# Patient Record
Sex: MALE | Race: WHITE | Employment: UNEMPLOYED | URBAN - METROPOLITAN AREA
[De-identification: names, ages, dates, MRNs, and addresses within clinical notes are randomized per-mention and may not be internally consistent; named-entity substitution may affect disease eponyms.]

---

## 2017-07-15 ENCOUNTER — APPOINTMENT (OUTPATIENT)
Dept: GENERAL RADIOLOGY | Facility: HOSPITAL | Age: 52
DRG: 866 | End: 2017-07-15
Attending: EMERGENCY MEDICINE
Payer: COMMERCIAL

## 2017-07-15 ENCOUNTER — HOSPITAL ENCOUNTER (INPATIENT)
Facility: HOSPITAL | Age: 52
LOS: 3 days | Discharge: LEFT BEFORE DISCHARGE | DRG: 866 | End: 2017-07-18
Attending: EMERGENCY MEDICINE | Admitting: INTERNAL MEDICINE
Payer: COMMERCIAL

## 2017-07-15 DIAGNOSIS — R55 SYNCOPE, NEAR: Primary | ICD-10-CM

## 2017-07-15 DIAGNOSIS — R73.9 HYPERGLYCEMIA: ICD-10-CM

## 2017-07-15 DIAGNOSIS — D69.6 THROMBOCYTOPENIA (HCC): ICD-10-CM

## 2017-07-15 DIAGNOSIS — B02.30 HERPES ZOSTER WITH OPHTHALMIC COMPLICATION, UNSPECIFIED HERPES ZOSTER EYE DISEASE: ICD-10-CM

## 2017-07-15 DIAGNOSIS — E87.1 HYPONATREMIA: ICD-10-CM

## 2017-07-15 LAB
ALBUMIN SERPL-MCNC: 3.5 G/DL (ref 3.5–4.8)
ALP LIVER SERPL-CCNC: 91 U/L (ref 45–117)
ALT SERPL-CCNC: 35 U/L (ref 17–63)
AST SERPL-CCNC: 22 U/L (ref 15–41)
BASOPHILS # BLD AUTO: 0.05 X10(3) UL (ref 0–0.1)
BASOPHILS NFR BLD AUTO: 0.6 %
BILIRUB SERPL-MCNC: 0.9 MG/DL (ref 0.1–2)
BILIRUB UR QL STRIP.AUTO: NEGATIVE
BUN BLD-MCNC: 16 MG/DL (ref 8–20)
CALCIUM BLD-MCNC: 8.8 MG/DL (ref 8.3–10.3)
CHLORIDE: 97 MMOL/L (ref 101–111)
CLARITY UR REFRACT.AUTO: CLEAR
CO2: 25 MMOL/L (ref 22–32)
COLOR UR AUTO: YELLOW
CREAT BLD-MCNC: 1.26 MG/DL (ref 0.7–1.3)
EOSINOPHIL # BLD AUTO: 0.03 X10(3) UL (ref 0–0.3)
EOSINOPHIL NFR BLD AUTO: 0.3 %
ERYTHROCYTE [DISTWIDTH] IN BLOOD BY AUTOMATED COUNT: 13.5 % (ref 11.5–16)
GLUCOSE BLD-MCNC: 143 MG/DL (ref 70–99)
GLUCOSE UR STRIP.AUTO-MCNC: NEGATIVE MG/DL
HCT VFR BLD AUTO: 47.6 % (ref 37–53)
HGB BLD-MCNC: 17 G/DL (ref 13–17)
IMMATURE GRANULOCYTE COUNT: 0.07 X10(3) UL (ref 0–1)
IMMATURE GRANULOCYTE RATIO %: 0.8 %
KETONES UR STRIP.AUTO-MCNC: NEGATIVE MG/DL
LEUKOCYTE ESTERASE UR QL STRIP.AUTO: NEGATIVE
LYMPHOCYTES # BLD AUTO: 1.02 X10(3) UL (ref 0.9–4)
LYMPHOCYTES NFR BLD AUTO: 11.3 %
M PROTEIN MFR SERPL ELPH: 7.1 G/DL (ref 6.1–8.3)
MCH RBC QN AUTO: 33.4 PG (ref 27–33.2)
MCHC RBC AUTO-ENTMCNC: 35.7 G/DL (ref 31–37)
MCV RBC AUTO: 93.5 FL (ref 80–99)
MONOCYTES # BLD AUTO: 0.55 X10(3) UL (ref 0.1–0.6)
MONOCYTES NFR BLD AUTO: 6.1 %
NEUTROPHIL ABS PRELIM: 7.3 X10 (3) UL (ref 1.3–6.7)
NEUTROPHILS # BLD AUTO: 7.3 X10(3) UL (ref 1.3–6.7)
NEUTROPHILS NFR BLD AUTO: 80.9 %
NITRITE UR QL STRIP.AUTO: NEGATIVE
PH UR STRIP.AUTO: 6 [PH] (ref 4.5–8)
PLATELET # BLD AUTO: 109 10(3)UL (ref 150–450)
POTASSIUM SERPL-SCNC: 4 MMOL/L (ref 3.6–5.1)
PROT UR STRIP.AUTO-MCNC: NEGATIVE MG/DL
RBC # BLD AUTO: 5.09 X10(6)UL (ref 4.3–5.7)
RED CELL DISTRIBUTION WIDTH-SD: 46.7 FL (ref 35.1–46.3)
SODIUM SERPL-SCNC: 132 MMOL/L (ref 136–144)
SP GR UR STRIP.AUTO: 1.01 (ref 1–1.03)
TROPONIN: <0.046 NG/ML (ref ?–0.05)
UROBILINOGEN UR STRIP.AUTO-MCNC: <2 MG/DL
WBC # BLD AUTO: 9 X10(3) UL (ref 4–13)

## 2017-07-15 PROCEDURE — 99285 EMERGENCY DEPT VISIT HI MDM: CPT

## 2017-07-15 PROCEDURE — 96361 HYDRATE IV INFUSION ADD-ON: CPT

## 2017-07-15 PROCEDURE — 80053 COMPREHEN METABOLIC PANEL: CPT | Performed by: EMERGENCY MEDICINE

## 2017-07-15 PROCEDURE — 84484 ASSAY OF TROPONIN QUANT: CPT | Performed by: EMERGENCY MEDICINE

## 2017-07-15 PROCEDURE — 71010 XR CHEST AP PORTABLE  (CPT=71010): CPT | Performed by: EMERGENCY MEDICINE

## 2017-07-15 PROCEDURE — 96365 THER/PROPH/DIAG IV INF INIT: CPT

## 2017-07-15 PROCEDURE — 93005 ELECTROCARDIOGRAM TRACING: CPT

## 2017-07-15 PROCEDURE — 87150 DNA/RNA AMPLIFIED PROBE: CPT | Performed by: EMERGENCY MEDICINE

## 2017-07-15 PROCEDURE — 36415 COLL VENOUS BLD VENIPUNCTURE: CPT

## 2017-07-15 PROCEDURE — 87076 CULTURE ANAEROBE IDENT EACH: CPT | Performed by: EMERGENCY MEDICINE

## 2017-07-15 PROCEDURE — 87070 CULTURE OTHR SPECIMN AEROBIC: CPT | Performed by: EMERGENCY MEDICINE

## 2017-07-15 PROCEDURE — 87040 BLOOD CULTURE FOR BACTERIA: CPT | Performed by: EMERGENCY MEDICINE

## 2017-07-15 PROCEDURE — 87205 SMEAR GRAM STAIN: CPT | Performed by: EMERGENCY MEDICINE

## 2017-07-15 PROCEDURE — S0077 INJECTION, CLINDAMYCIN PHOSP: HCPCS | Performed by: EMERGENCY MEDICINE

## 2017-07-15 PROCEDURE — 93010 ELECTROCARDIOGRAM REPORT: CPT | Performed by: INTERNAL MEDICINE

## 2017-07-15 PROCEDURE — 93010 ELECTROCARDIOGRAM REPORT: CPT

## 2017-07-15 PROCEDURE — 81001 URINALYSIS AUTO W/SCOPE: CPT | Performed by: EMERGENCY MEDICINE

## 2017-07-15 PROCEDURE — 85025 COMPLETE CBC W/AUTO DIFF WBC: CPT | Performed by: EMERGENCY MEDICINE

## 2017-07-15 RX ORDER — ACETAMINOPHEN 325 MG/1
650 TABLET ORAL EVERY 6 HOURS PRN
Status: DISCONTINUED | OUTPATIENT
Start: 2017-07-15 | End: 2017-07-18

## 2017-07-15 RX ORDER — ENOXAPARIN SODIUM 100 MG/ML
40 INJECTION SUBCUTANEOUS DAILY
Status: DISCONTINUED | OUTPATIENT
Start: 2017-07-15 | End: 2017-07-18

## 2017-07-15 RX ORDER — NICOTINE 21 MG/24HR
1 PATCH, TRANSDERMAL 24 HOURS TRANSDERMAL DAILY
Status: DISCONTINUED | OUTPATIENT
Start: 2017-07-15 | End: 2017-07-15

## 2017-07-15 RX ORDER — ONDANSETRON 2 MG/ML
4 INJECTION INTRAMUSCULAR; INTRAVENOUS EVERY 4 HOURS PRN
Status: DISCONTINUED | OUTPATIENT
Start: 2017-07-15 | End: 2017-07-15

## 2017-07-15 RX ORDER — PREDNISOLONE ACETATE 10 MG/ML
SUSPENSION/ DROPS OPHTHALMIC 2 TIMES DAILY
COMMUNITY
End: 2017-09-07

## 2017-07-15 RX ORDER — ONDANSETRON 2 MG/ML
4 INJECTION INTRAMUSCULAR; INTRAVENOUS EVERY 6 HOURS PRN
Status: DISCONTINUED | OUTPATIENT
Start: 2017-07-15 | End: 2017-07-18

## 2017-07-15 RX ORDER — ACETAMINOPHEN AND CODEINE PHOSPHATE 300; 30 MG/1; MG/1
1 TABLET ORAL EVERY 4 HOURS PRN
Status: DISCONTINUED | OUTPATIENT
Start: 2017-07-15 | End: 2017-07-16

## 2017-07-15 RX ORDER — GABAPENTIN 300 MG/1
300 CAPSULE ORAL 3 TIMES DAILY
Status: DISCONTINUED | OUTPATIENT
Start: 2017-07-15 | End: 2017-07-18

## 2017-07-15 RX ORDER — SODIUM CHLORIDE 9 MG/ML
INJECTION, SOLUTION INTRAVENOUS CONTINUOUS
Status: DISCONTINUED | OUTPATIENT
Start: 2017-07-15 | End: 2017-07-18

## 2017-07-15 RX ORDER — NICOTINE 21 MG/24HR
1 PATCH, TRANSDERMAL 24 HOURS TRANSDERMAL DAILY
Status: DISCONTINUED | OUTPATIENT
Start: 2017-07-15 | End: 2017-07-18

## 2017-07-15 RX ORDER — SODIUM CHLORIDE 9 MG/ML
INJECTION, SOLUTION INTRAVENOUS CONTINUOUS
Status: DISCONTINUED | OUTPATIENT
Start: 2017-07-15 | End: 2017-07-15

## 2017-07-15 RX ORDER — CLINDAMYCIN PHOSPHATE 600 MG/50ML
600 INJECTION INTRAVENOUS ONCE
Status: COMPLETED | OUTPATIENT
Start: 2017-07-15 | End: 2017-07-15

## 2017-07-15 NOTE — PROGRESS NOTES
Received this patient from ER. Patient admitted due to left facial/head shingels and syncopal episode at home. Patient is A&O x3. Tele: sinus rhythm. VSS. Afebrile. Left eye: severe swollen, unable to open left eye,   Laft face: swollen.   Left head: Red

## 2017-07-15 NOTE — ED PROVIDER NOTES
Patient Seen in: [facility] Emergency Department    History   Patient presents with:  Syncope (cardiovascular, neurologic)  Rash Skin Problem (integumentary)    Stated Complaint: Shingles, Syncope    HPI    Patient started having a rash on the left LLC  2005: HERNIA SURGERY      Comment: LT INGUINAL  8/19/08  [facility]): OTHER SURGICAL HISTORY      Comment: EGD (GERD): esophagitis, gastritis, hiatal                hernia; ? eosinophilic esophagitis, distal                esoph bx with inc above.    PSFH elements reviewed from today and agreed except as otherwise stated in HPI.     Physical Exam   ED Triage Vitals [07/15/17 1044]  BP: 110/69  Pulse: 80  Resp: 16  Temp: 98.5 °F (36.9 °C)  Temp src: n/a  SpO2: 98 %  O2 Device: n/a    Current:BP .0 (*)     MCH 33.4 (*)     RDW-SD 46.7 (*)     Neutrophil Absolute Prelim 7.30 (*)     Neutrophil Absolute 7.30 (*)     All other components within normal limits   TROPONIN I - Normal   CBC WITH DIFFERENTIAL WITH PLATELET    Narrative:      The foll . She will admit   I discussed the case with the [facility] ophthalmologist, Dr. Staci Juarez.   He will consult with the hospitalist regarding any further treatment recommendations from his service    Disposition and Plan     Clinical Impression:  Syncope,

## 2017-07-15 NOTE — CONSULTS
INFECTIOUS DISEASE CONSULTATION    Karen Mckee Patient Status:  Inpatient    1965 MRN [id number]   [facility] 3NE-A Attending Vu Pascual, [address] [facility] Day # 0 PCP Norvel Lefort, Performed by Kodak Campos at [address]  05/01/2012: SKIN SURGERY      Comment: BCC nodular / R medial canthus / Mohs surgery                by Dr. Yuriy Alfonso  Family History   Problem Relation Age of Onset   • Stroke Fath erythema, no open wounds      Laboratory Data:      Recent Labs   Lab  07/15/17   1059   RBC  5.09   HGB  17.0   HCT  47.6   MCV  93.5   MCH  33.4*   MCHC  35.7   RDW  13.5   NEPRELIM  7.30*   WBC  9.0   PLT  109.0*       Recent Labs   Lab  07/15/17   1059

## 2017-07-16 LAB
BASOPHILS # BLD AUTO: 0.03 X10(3) UL (ref 0–0.1)
BASOPHILS NFR BLD AUTO: 0.5 %
BUN BLD-MCNC: 11 MG/DL (ref 8–20)
CALCIUM BLD-MCNC: 8.1 MG/DL (ref 8.3–10.3)
CHLORIDE: 105 MMOL/L (ref 101–111)
CO2: 25 MMOL/L (ref 22–32)
CREAT BLD-MCNC: 0.75 MG/DL (ref 0.7–1.3)
EOSINOPHIL # BLD AUTO: 0.03 X10(3) UL (ref 0–0.3)
EOSINOPHIL NFR BLD AUTO: 0.5 %
ERYTHROCYTE [DISTWIDTH] IN BLOOD BY AUTOMATED COUNT: 13.4 % (ref 11.5–16)
GLUCOSE BLD-MCNC: 100 MG/DL (ref 70–99)
HAV IGM SER QL: 2.1 MG/DL (ref 1.7–3)
HCT VFR BLD AUTO: 41.6 % (ref 37–53)
HGB BLD-MCNC: 14.7 G/DL (ref 13–17)
IMMATURE GRANULOCYTE COUNT: 0.07 X10(3) UL (ref 0–1)
IMMATURE GRANULOCYTE RATIO %: 1.1 %
LYMPHOCYTES # BLD AUTO: 1.79 X10(3) UL (ref 0.9–4)
LYMPHOCYTES NFR BLD AUTO: 28.8 %
MCH RBC QN AUTO: 33.5 PG (ref 27–33.2)
MCHC RBC AUTO-ENTMCNC: 35.3 G/DL (ref 31–37)
MCV RBC AUTO: 94.8 FL (ref 80–99)
MONOCYTES # BLD AUTO: 0.66 X10(3) UL (ref 0.1–0.6)
MONOCYTES NFR BLD AUTO: 10.6 %
NEUTROPHIL ABS PRELIM: 3.64 X10 (3) UL (ref 1.3–6.7)
NEUTROPHILS # BLD AUTO: 3.64 X10(3) UL (ref 1.3–6.7)
NEUTROPHILS NFR BLD AUTO: 58.5 %
PLATELET # BLD AUTO: 95 10(3)UL (ref 150–450)
POTASSIUM SERPL-SCNC: 3.7 MMOL/L (ref 3.6–5.1)
RBC # BLD AUTO: 4.39 X10(6)UL (ref 4.3–5.7)
RED CELL DISTRIBUTION WIDTH-SD: 47.1 FL (ref 35.1–46.3)
SODIUM SERPL-SCNC: 138 MMOL/L (ref 136–144)
TSI SER-ACNC: 0.88 MIU/ML (ref 0.35–5.5)
WBC # BLD AUTO: 6.2 X10(3) UL (ref 4–13)

## 2017-07-16 PROCEDURE — 80048 BASIC METABOLIC PNL TOTAL CA: CPT | Performed by: INTERNAL MEDICINE

## 2017-07-16 PROCEDURE — 93010 ELECTROCARDIOGRAM REPORT: CPT | Performed by: INTERNAL MEDICINE

## 2017-07-16 PROCEDURE — 84443 ASSAY THYROID STIM HORMONE: CPT | Performed by: INTERNAL MEDICINE

## 2017-07-16 PROCEDURE — 83735 ASSAY OF MAGNESIUM: CPT | Performed by: INTERNAL MEDICINE

## 2017-07-16 PROCEDURE — 85025 COMPLETE CBC W/AUTO DIFF WBC: CPT | Performed by: INTERNAL MEDICINE

## 2017-07-16 PROCEDURE — 93005 ELECTROCARDIOGRAM TRACING: CPT

## 2017-07-16 RX ORDER — TRAMADOL HYDROCHLORIDE 50 MG/1
TABLET ORAL EVERY 6 HOURS PRN
Status: DISCONTINUED | OUTPATIENT
Start: 2017-07-16 | End: 2017-07-18

## 2017-07-16 RX ORDER — PREDNISOLONE ACETATE 10 MG/ML
1 SUSPENSION/ DROPS OPHTHALMIC 2 TIMES DAILY
Status: DISCONTINUED | OUTPATIENT
Start: 2017-07-16 | End: 2017-07-18

## 2017-07-16 RX ORDER — TRAMADOL HYDROCHLORIDE 50 MG/1
50 TABLET ORAL EVERY 6 HOURS PRN
Status: DISCONTINUED | OUTPATIENT
Start: 2017-07-16 | End: 2017-07-16

## 2017-07-16 RX ORDER — POTASSIUM CHLORIDE 20 MEQ/1
40 TABLET, EXTENDED RELEASE ORAL ONCE
Status: COMPLETED | OUTPATIENT
Start: 2017-07-16 | End: 2017-07-16

## 2017-07-16 RX ORDER — ASPIRIN 81 MG/1
81 TABLET, CHEWABLE ORAL DAILY
Status: DISCONTINUED | OUTPATIENT
Start: 2017-07-16 | End: 2017-07-17

## 2017-07-16 NOTE — PLAN OF CARE
Assumed pt care at 1545. Aa/ox4. Breathing unlabored. Denies pain. 1622-tachycardia on monitor up to 140-150s, appears rapid afib on bedside telemetry monitor (some artifact present). Patient feels palpitations. Denies lightheadedness/dizziness.  Episod

## 2017-07-16 NOTE — PLAN OF CARE
NEUROLOGICAL - ADULT    • Achieves stable or improved neurological status Progressing        PAIN - ADULT    • Verbalizes/displays adequate comfort level or patient's stated pain goal Progressing        Patient/Family Goals    • Patient/Family Long Term Go

## 2017-07-16 NOTE — H&P
General Medicine H&P     Patient presents with:  Syncope (cardiovascular, neurologic)  Rash Skin Problem (integumentary)       PCP: Lory Mejia MD    History of Present Illness: Patient is a 45year old male with PMH sig for GERD, HL, who p/t [address] [facility] ED c [facility] reflux  8/32/6150: REPAIR UMBILICAL TLYP,0+B/H,VPHKT      Comment: Performed by Stephany Wilder at [address]  05/01/2012: SKIN SURGERY      Comment: BCC nodular / R medial canthus / Mohs surgery                by Dr. Monica Samuel CA 8.1 07/16/2017       Radiology: Xr Chest Ap Portable  (cpt=71010)    Result Date: 7/15/2017  PROCEDURE:  XR CHEST AP PORTABLE (CPT=71010)  TECHNIQUE:  AP chest radiograph was obtained. COMPARISON:  None.   INDICATIONS:  Shingles, Syncope  PATIENT STAT

## 2017-07-16 NOTE — PROGRESS NOTES
[facility]                INFECTIOUS DISEASE PROGRESS NOTE    Audrey Johns Patient Status:  Inpatient    1965 MRN [id number]   [facility] 3NE-A Attending Annia Mtz, [address] [facility] Day # 1 PCP Tyron William MD     [facility] ([facility] [address]     Hyponatremia     Hyperglycemia      ASSESSMENT/PLAN:  1.  Zoster left orbit/head  Left eyelid swelling improved  Continue IV acyclovir, and cefazolin for poss bact infection   Await optho eval    2 Skin/heat rash, less likely herpetic vesicles  No

## 2017-07-17 ENCOUNTER — APPOINTMENT (OUTPATIENT)
Dept: CV DIAGNOSTICS | Facility: HOSPITAL | Age: 52
DRG: 866 | End: 2017-07-17
Attending: INTERNAL MEDICINE
Payer: COMMERCIAL

## 2017-07-17 LAB
ATRIAL RATE: 122 BPM
ATRIAL RATE: 163 BPM
BASOPHILS # BLD AUTO: 0.05 X10(3) UL (ref 0–0.1)
BASOPHILS NFR BLD AUTO: 0.8 %
BUN BLD-MCNC: 9 MG/DL (ref 8–20)
CALCIUM BLD-MCNC: 8.6 MG/DL (ref 8.3–10.3)
CHLORIDE: 106 MMOL/L (ref 101–111)
CO2: 27 MMOL/L (ref 22–32)
CREAT BLD-MCNC: 0.74 MG/DL (ref 0.7–1.3)
EOSINOPHIL # BLD AUTO: 0.09 X10(3) UL (ref 0–0.3)
EOSINOPHIL NFR BLD AUTO: 1.4 %
ERYTHROCYTE [DISTWIDTH] IN BLOOD BY AUTOMATED COUNT: 13.6 % (ref 11.5–16)
GLUCOSE BLD-MCNC: 81 MG/DL (ref 70–99)
HAV IGM SER QL: 2.3 MG/DL (ref 1.7–3)
HCT VFR BLD AUTO: 42.9 % (ref 37–53)
HGB BLD-MCNC: 14.9 G/DL (ref 13–17)
IMMATURE GRANULOCYTE COUNT: 0.07 X10(3) UL (ref 0–1)
IMMATURE GRANULOCYTE RATIO %: 1.1 %
LYMPHOCYTES # BLD AUTO: 2.7 X10(3) UL (ref 0.9–4)
LYMPHOCYTES NFR BLD AUTO: 41.2 %
MCH RBC QN AUTO: 33.1 PG (ref 27–33.2)
MCHC RBC AUTO-ENTMCNC: 34.7 G/DL (ref 31–37)
MCV RBC AUTO: 95.3 FL (ref 80–99)
MONOCYTES # BLD AUTO: 0.62 X10(3) UL (ref 0.1–0.6)
MONOCYTES NFR BLD AUTO: 9.5 %
NEUTROPHIL ABS PRELIM: 3.02 X10 (3) UL (ref 1.3–6.7)
NEUTROPHILS # BLD AUTO: 3.02 X10(3) UL (ref 1.3–6.7)
NEUTROPHILS NFR BLD AUTO: 46 %
P AXIS: 80 DEGREES
P-R INTERVAL: 130 MS
PLATELET # BLD AUTO: 108 10(3)UL (ref 150–450)
POTASSIUM SERPL-SCNC: 4.1 MMOL/L (ref 3.6–5.1)
POTASSIUM SERPL-SCNC: 4.1 MMOL/L (ref 3.6–5.1)
Q-T INTERVAL: 300 MS
Q-T INTERVAL: 312 MS
QRS DURATION: 72 MS
QRS DURATION: 94 MS
QTC CALCULATION (BEZET): 444 MS
QTC CALCULATION (BEZET): 492 MS
R AXIS: -24 DEGREES
R AXIS: 20 DEGREES
RBC # BLD AUTO: 4.5 X10(6)UL (ref 4.3–5.7)
RED CELL DISTRIBUTION WIDTH-SD: 48 FL (ref 35.1–46.3)
SODIUM SERPL-SCNC: 138 MMOL/L (ref 136–144)
T AXIS: 46 DEGREES
T AXIS: 49 DEGREES
VENTRICULAR RATE: 122 BPM
VENTRICULAR RATE: 162 BPM
WBC # BLD AUTO: 6.6 X10(3) UL (ref 4–13)

## 2017-07-17 PROCEDURE — 84132 ASSAY OF SERUM POTASSIUM: CPT | Performed by: INTERNAL MEDICINE

## 2017-07-17 PROCEDURE — 93306 TTE W/DOPPLER COMPLETE: CPT | Performed by: INTERNAL MEDICINE

## 2017-07-17 PROCEDURE — 85025 COMPLETE CBC W/AUTO DIFF WBC: CPT | Performed by: INTERNAL MEDICINE

## 2017-07-17 PROCEDURE — 80048 BASIC METABOLIC PNL TOTAL CA: CPT | Performed by: INTERNAL MEDICINE

## 2017-07-17 PROCEDURE — 83735 ASSAY OF MAGNESIUM: CPT | Performed by: INTERNAL MEDICINE

## 2017-07-17 RX ORDER — ROSUVASTATIN CALCIUM 20 MG/1
20 TABLET, COATED ORAL NIGHTLY
Status: DISCONTINUED | OUTPATIENT
Start: 2017-07-17 | End: 2017-07-18

## 2017-07-17 RX ORDER — ASPIRIN 325 MG
325 TABLET, DELAYED RELEASE (ENTERIC COATED) ORAL DAILY
Status: DISCONTINUED | OUTPATIENT
Start: 2017-07-17 | End: 2017-07-18

## 2017-07-17 NOTE — PROGRESS NOTES
[facility]                INFECTIOUS DISEASE PROGRESS NOTE    Allenelma Paz Patient Status:  Inpatient    1965 MRN [id number]   [facility] 3NE-A Attending Carson Teran, [address] [facility] Day # 2 PCP MD Anais Martinez forehead/ scalp with suspected superimposed cellulitis  Left eyelid swelling improved per notes but still with many active lesions and intense erythema  Continue IV acyclovir and cefazolin for poss bact infection for another 24 hr  Appreciate yaron WAN

## 2017-07-17 NOTE — PAYOR COMM NOTE
--------------  ADMISSION REVIEW     Payor: Leonardo Arevalo [facility] #:  [id number]  Authorization Number: [id number]    Admit date: 7/15/2017 10:34 AM       Admitting Physician: Viv Jiang DO  Attending Physician:  Meghan Doss his eye swollen shut. The right lid is unremarkable. The right pupil is round and reacts. Extraocular movements are intact. Skin: Maculopapular rash over the upper torso and proximal upper extremities.        ED Course[SS.1]     EKG    Rate, intervals a discharge from eye  Skin: erythematous macules noted on back    LABS:     Lab Results  Component Value Date   WBC 6.2 07/16/2017   HGB 14.7 07/16/2017   HCT 41.6 07/16/2017   PLT 95.0 07/16/2017   CREATSERUM 0.75 07/16/2017   BUN 11 07/16/2017    07/ Bag 2 g Intravenous Morales Joaquin, KEY    7/17/2017 0021 New Bag 2 g Intravenous Marin Epley, KEY    7/16/2017 1612 New Bag 2 g Intravenous Beltran Esparza RN      Enoxaparin Sodium (LOVENOX) 40 MG/0.4ML injection 40 mg Daily    Date Action Dose Rou exam     A/p : Shingles with mild conjunctival inflammation: PT can continue topical gtts as MRX by his Eye MD     Cardiology Consult  Reason for Consultation: a flutter    Patient is 45year old male who is in the hospital with shingles and possible secon

## 2017-07-17 NOTE — PROGRESS NOTES
[facility] Hospitalist Progress Note     PCP: Rocky Grimes MD    Chief Complaint: follow-up    Overnight/Interim Events:  Pt noted to have periods of tachycardia yesterday. SUBJECTIVE:  Pt feeling a little better today, able to open eye more.  Would like to metoprolol Tartrate  25 mg Oral 2x Daily(Beta Blocker)   • enoxaparin  40 mg Subcutaneous Daily   • acyclovir  800 mg Intravenous Q8H   • ceFAZolin  2 g Intravenous Q8H   • gabapentin  300 mg Oral TID   • nicotine  1 patch Transdermal Daily     • sodium ch

## 2017-07-17 NOTE — CONSULTS
Pt was followed and recently seen at [facility] for Herpes Zoster  No corneal findings identified. Pt was placed on topical steroids and antivial for prophylxis. Pt resports vesicles worse and eyelid swollen. No change in vision, no phtotophobia, no floaters. Yung Gonzalez

## 2017-07-17 NOTE — CONSULTS
Cardiology Consult Note      SUBJECTIVE:    Reason for Consultation: a flutter    History of Present Illness: Patient is 45year old male who is in the hospital with shingles and possible secondary infection who was noted on monitor to have a flutter.   Sta  mg Oral Q6H PRN   Ganciclovir 0.15 % GEL 1 drop 1 drop Left Eye 5 x daily   prednisoLONE acetate (PRED FORTE) 1 % ophthalmic suspension 1 drop 1 drop Left Eye BID   metoprolol Tartrate (LOPRESSOR) tab 25 mg 25 mg Oral 2x Daily(Beta Blocker)   0.9% esophagitis, gastritis, hiatal                hernia; ? eosinophilic esophagitis, distal                esoph bx with increased eosinophilis called c/w               reflux  7/17/9648: REPAIR UMBILICAL EFZW,8+I/C,GRIPB      Comment: Performed by [place] midline and thyroid not enlarged, symmetric, no tenderness/mass/nodules  Lungs: clear to auscultation bilaterally  Chest wall: no tenderness  Heart: S1, S2 normal, no murmur, click, rub or gallop, regular rate and rhythm  Abdomen: soft, non-tender; bowel s asa 162 or 325   Check echo              tsh which is nl  2.   htn switch from ace to BB  3. Dyslipidemia with 10 yr risk over 16 will add crestor 20 mg  4. Shingles   5.     CIARAN untreated would recommend evaluation as out pt and treating

## 2017-07-18 VITALS
TEMPERATURE: 98 F | HEIGHT: 74 IN | WEIGHT: 200 LBS | SYSTOLIC BLOOD PRESSURE: 130 MMHG | RESPIRATION RATE: 18 BRPM | OXYGEN SATURATION: 100 % | DIASTOLIC BLOOD PRESSURE: 84 MMHG | HEART RATE: 62 BPM | BODY MASS INDEX: 25.67 KG/M2

## 2017-07-18 RX ORDER — CEFADROXIL 500 MG/1
500 CAPSULE ORAL 2 TIMES DAILY
Qty: 14 CAPSULE | Refills: 0 | Status: SHIPPED | OUTPATIENT
Start: 2017-07-18 | End: 2017-07-25

## 2017-07-18 RX ORDER — VALACYCLOVIR HYDROCHLORIDE 1 G/1
1 TABLET, FILM COATED ORAL EVERY 8 HOURS SCHEDULED
Qty: 21 TABLET | Refills: 0 | Status: SHIPPED | OUTPATIENT
Start: 2017-07-18 | End: 2017-07-25

## 2017-07-18 NOTE — PLAN OF CARE
Patient very upset when I picked him up as a nurse this am.  He states if I dont have the doctors here by 1100 he is leaving [address]. I spoke with and informed dr. Reji Gay, dr. Calvin Merritt from cardiology. All stated he needs to stay and be seen.   Patien

## 2017-07-18 NOTE — DISCHARGE SUMMARY
[facility] Internal Medicine Discharge Summary   Patient ID:  Mundo Kirk  [id number]  12 year old  9/6/1965    Admit date: 7/15/2017    Discharge date and time: 7/18/2017     Attending Physician: Tonya Valentine    Primary Care Physician: Cortes Lee MD +blurry vision, +water dc from eyes.  +L facial swelling, difficulty opening eye.      Hospital Course:   Herpes zoster forehead/scalp, eyelid involvmenet  -IV acyclovir, started IV ancef for suspected secondary bacterial infection/cellulitis  -d/w Dr. Cardona tablet (10 mg total) by mouth daily.      methylPREDNISolone 4 MG Tbpk  Commonly known as:  MEDROL DOSEPACK  Use as directed for 6 days     prednisoLONE acetate 1 % Susp  Commonly known as:  PRED FORTE     ZIRGAN 0.15 % Gel  Generic drug:  Ganciclovir

## 2017-11-22 PROBLEM — R73.9 HYPERGLYCEMIA: Status: RESOLVED | Noted: 2017-07-15 | Resolved: 2017-11-22

## 2017-11-22 PROBLEM — E87.1 HYPONATREMIA: Status: RESOLVED | Noted: 2017-07-15 | Resolved: 2017-11-22

## 2020-09-17 PROBLEM — R55 SYNCOPE, NEAR: Status: RESOLVED | Noted: 2017-07-15 | Resolved: 2020-09-17

## 2021-04-05 ENCOUNTER — APPOINTMENT (OUTPATIENT)
Dept: CV DIAGNOSTICS | Facility: HOSPITAL | Age: 56
End: 2021-04-05
Attending: INTERNAL MEDICINE
Payer: COMMERCIAL

## 2021-04-05 ENCOUNTER — APPOINTMENT (OUTPATIENT)
Dept: CT IMAGING | Facility: HOSPITAL | Age: 56
End: 2021-04-05
Attending: INTERNAL MEDICINE
Payer: COMMERCIAL

## 2021-04-05 ENCOUNTER — HOSPITAL ENCOUNTER (EMERGENCY)
Facility: HOSPITAL | Age: 56
Discharge: LEFT BEFORE DISCHARGE | End: 2021-04-05
Attending: EMERGENCY MEDICINE
Payer: COMMERCIAL

## 2021-04-05 ENCOUNTER — APPOINTMENT (OUTPATIENT)
Dept: CV DIAGNOSTICS | Facility: HOSPITAL | Age: 56
End: 2021-04-05
Attending: EMERGENCY MEDICINE
Payer: COMMERCIAL

## 2021-04-05 ENCOUNTER — APPOINTMENT (OUTPATIENT)
Dept: GENERAL RADIOLOGY | Facility: HOSPITAL | Age: 56
End: 2021-04-05
Attending: EMERGENCY MEDICINE
Payer: COMMERCIAL

## 2021-04-05 VITALS
TEMPERATURE: 98 F | OXYGEN SATURATION: 96 % | WEIGHT: 200 LBS | BODY MASS INDEX: 25.67 KG/M2 | SYSTOLIC BLOOD PRESSURE: 127 MMHG | RESPIRATION RATE: 18 BRPM | DIASTOLIC BLOOD PRESSURE: 88 MMHG | HEART RATE: 66 BPM | HEIGHT: 74 IN

## 2021-04-05 DIAGNOSIS — I47.1 SVT (SUPRAVENTRICULAR TACHYCARDIA) (HCC): Primary | ICD-10-CM

## 2021-04-05 PROBLEM — R73.9 HYPERGLYCEMIA: Status: ACTIVE | Noted: 2021-04-05

## 2021-04-05 PROCEDURE — 85025 COMPLETE CBC W/AUTO DIFF WBC: CPT | Performed by: EMERGENCY MEDICINE

## 2021-04-05 PROCEDURE — 96361 HYDRATE IV INFUSION ADD-ON: CPT

## 2021-04-05 PROCEDURE — 93306 TTE W/DOPPLER COMPLETE: CPT | Performed by: INTERNAL MEDICINE

## 2021-04-05 PROCEDURE — 84443 ASSAY THYROID STIM HORMONE: CPT | Performed by: EMERGENCY MEDICINE

## 2021-04-05 PROCEDURE — 96375 TX/PRO/DX INJ NEW DRUG ADDON: CPT

## 2021-04-05 PROCEDURE — 83735 ASSAY OF MAGNESIUM: CPT | Performed by: EMERGENCY MEDICINE

## 2021-04-05 PROCEDURE — 93010 ELECTROCARDIOGRAM REPORT: CPT

## 2021-04-05 PROCEDURE — 84484 ASSAY OF TROPONIN QUANT: CPT | Performed by: EMERGENCY MEDICINE

## 2021-04-05 PROCEDURE — 71045 X-RAY EXAM CHEST 1 VIEW: CPT | Performed by: EMERGENCY MEDICINE

## 2021-04-05 PROCEDURE — 93005 ELECTROCARDIOGRAM TRACING: CPT

## 2021-04-05 PROCEDURE — 83880 ASSAY OF NATRIURETIC PEPTIDE: CPT | Performed by: EMERGENCY MEDICINE

## 2021-04-05 PROCEDURE — 80053 COMPREHEN METABOLIC PANEL: CPT | Performed by: EMERGENCY MEDICINE

## 2021-04-05 PROCEDURE — 96374 THER/PROPH/DIAG INJ IV PUSH: CPT

## 2021-04-05 PROCEDURE — 99285 EMERGENCY DEPT VISIT HI MDM: CPT

## 2021-04-05 PROCEDURE — 96376 TX/PRO/DX INJ SAME DRUG ADON: CPT

## 2021-04-05 RX ORDER — ASPIRIN 81 MG/1
324 TABLET, CHEWABLE ORAL ONCE
Status: COMPLETED | OUTPATIENT
Start: 2021-04-05 | End: 2021-04-05

## 2021-04-05 RX ORDER — METOPROLOL TARTRATE 50 MG/1
50 TABLET, FILM COATED ORAL ONCE
Status: COMPLETED | OUTPATIENT
Start: 2021-04-05 | End: 2021-04-05

## 2021-04-05 RX ORDER — ASPIRIN 325 MG
325 TABLET ORAL DAILY
COMMUNITY

## 2021-04-05 RX ORDER — ADENOSINE 3 MG/ML
6 INJECTION, SOLUTION INTRAVENOUS ONCE
Status: COMPLETED | OUTPATIENT
Start: 2021-04-05 | End: 2021-04-05

## 2021-04-05 RX ORDER — DILTIAZEM HYDROCHLORIDE 5 MG/ML
INJECTION INTRAVENOUS
Status: DISCONTINUED
Start: 2021-04-05 | End: 2021-04-05 | Stop reason: WASHOUT

## 2021-04-05 RX ORDER — ROSUVASTATIN CALCIUM 10 MG/1
10 TABLET, COATED ORAL NIGHTLY
COMMUNITY
End: 2021-06-09

## 2021-04-05 RX ORDER — NAPROXEN SODIUM 220 MG
1-2 TABLET ORAL DAILY PRN
COMMUNITY
End: 2021-06-09 | Stop reason: ALTCHOICE

## 2021-04-05 RX ORDER — ADENOSINE 3 MG/ML
INJECTION, SOLUTION INTRAVENOUS
Status: COMPLETED
Start: 2021-04-05 | End: 2021-04-05

## 2021-04-05 RX ORDER — METOPROLOL SUCCINATE 50 MG/1
50 TABLET, EXTENDED RELEASE ORAL DAILY
Qty: 30 TABLET | Refills: 0 | Status: SHIPPED | OUTPATIENT
Start: 2021-04-05 | End: 2021-04-09

## 2021-04-05 RX ORDER — NITROGLYCERIN 0.4 MG/1
TABLET SUBLINGUAL
Status: DISCONTINUED
Start: 2021-04-05 | End: 2021-04-05 | Stop reason: WASHOUT

## 2021-04-05 RX ORDER — DILTIAZEM HYDROCHLORIDE 5 MG/ML
5 INJECTION INTRAVENOUS EVERY 5 MIN PRN
Status: DISCONTINUED | OUTPATIENT
Start: 2021-04-05 | End: 2021-04-05

## 2021-04-05 RX ORDER — NITROGLYCERIN 0.4 MG/1
0.4 TABLET SUBLINGUAL ONCE
Status: COMPLETED | OUTPATIENT
Start: 2021-04-05 | End: 2021-04-05

## 2021-04-05 RX ORDER — METOPROLOL TARTRATE 5 MG/5ML
5 INJECTION INTRAVENOUS ONCE
Status: COMPLETED | OUTPATIENT
Start: 2021-04-05 | End: 2021-04-05

## 2021-04-05 RX ORDER — METOPROLOL TARTRATE 5 MG/5ML
5 INJECTION INTRAVENOUS EVERY 5 MIN PRN
Status: DISCONTINUED | OUTPATIENT
Start: 2021-04-05 | End: 2021-04-05

## 2021-04-05 RX ORDER — METOPROLOL TARTRATE 5 MG/5ML
INJECTION INTRAVENOUS
Status: COMPLETED
Start: 2021-04-05 | End: 2021-04-05

## 2021-04-05 NOTE — CONSULTS
[facility] Cardiology Consultation    Calvin Foot Patient Status:  Emergency    1965 MRN [id number]   Location [address] [facility] Attending Eddie Chapa MD   Hosp Day # 0 PCP Raphael Hebert MD     Reason for Consultation:  SVT, el 8/19/08  CFS Cece Laboy)    EGD (GERD): esophagitis, gastritis, hiatal hernia; ? eosinophilic esophagitis, distal esoph bx with increased eosinophilis called c/w reflux   • SKIN SURGERY  05/01/2012    BCC nodular / R medial canthus / Mohs surgery by Dr. Julianna Partida Chem:  Recent Labs   Lab 04/05/21 0822      K 3.3*      CO2 27.0   BUN 14   CREATSERUM 1.17   CA 8.7   MG 2.2   *       Recent Labs   Lab 04/05/21 0822   ALT 31   AST 16   ALB 4.2       No results for input(s): PTP, INR in the la

## 2021-04-05 NOTE — ED QUICK NOTES
Pt reevaluated by dr. Flor Moreno, gave pt the option to be admitted in the hospital. Pt declined. Pt informed of risks involved in leaving against medical advise including deterioration in condtion which can result to death, pt verbalizing understanding.  Pt

## 2021-04-05 NOTE — ED QUICK NOTES
Pt back in room, per Luz Maria Hernandez pt's testing was not done, did not tolerate it and was claustrophobic. Per Luz Maria Shaw and Dr. Diane bush.

## 2021-04-05 NOTE — IMAGING NOTE
Patient received in CT room 4. Placed on monitor. Scan explained. Pre medication expl;ained Hr 48-81 BPM. Patient reports severe claustrophobia once placed in scanner  Patient needed to be moved outside of scanner \" I don't think I can do it\"  Patient chandler

## 2021-04-05 NOTE — ED PROVIDER NOTES
Patient Seen in: [facility] Emergency Department      History   Patient presents with:  Chest Pain Angina    Stated Complaint: cp    HPI/Subjective:   HPI    50-year-old with a history of hypertension, high cholesterol, GERD, sleep apnea, peptic ulc hemorrhoids; next in 5 yrs   • COLONOSCOPY, POSSIBLE BIOPSY, POSSIBLE POLYPECTOMY 70637 N/A 8/30/2013    Performed by Roel Weeks MD at [facility] 21  2005    LT INGUINAL   • OTHER SURGICAL HISTORY  8/19/08  CFS Phil Mejia) conversational dyspnea. Abdomen: Soft, nontender, nondistended. Back: No CVA tenderness. Extremities: No edema. No unilateral calf swelling or calf tenderness to palpation.   Skin: warm and dry, no diaphoresis    ED Course     Labs Reviewed   COMP MET #3    Rate, intervals and axes as noted on EKG Report. Rate: 67  Rhythm: Sinus Rhythm  Reading: Concave up J-point elevation inferior/anterior no T wave inversions or ST depression    Chest x-ray: Minimal atelectasis scarring in the lower lungs.        [place] recommended testing and treatment, alternative options, and the reasons tests and treatments are being recommended. I explained that by leaving against medical advice the patient is risking serious, life-threatening illness, injury, or even death.  The virgilio mouth daily.   Qty: 30 tablet Refills: 0

## 2021-04-09 PROBLEM — I47.1 SVT (SUPRAVENTRICULAR TACHYCARDIA) (HCC): Status: ACTIVE | Noted: 2021-04-09

## 2021-08-12 PROBLEM — H90.3 SENSORY HEARING LOSS, BILATERAL: Status: ACTIVE | Noted: 2021-08-12

## 2022-01-20 PROBLEM — B02.30 HERPES ZOSTER WITH OPHTHALMIC COMPLICATION, UNSPECIFIED HERPES ZOSTER EYE DISEASE: Status: RESOLVED | Noted: 2017-07-15 | Resolved: 2022-01-20

## 2022-02-26 ENCOUNTER — HOSPITAL ENCOUNTER (EMERGENCY)
Facility: HOSPITAL | Age: 57
Discharge: LEFT BEFORE DISCHARGE | End: 2022-02-26
Payer: COMMERCIAL

## 2022-02-26 VITALS
OXYGEN SATURATION: 96 % | TEMPERATURE: 98 F | BODY MASS INDEX: 28.1 KG/M2 | HEIGHT: 73 IN | WEIGHT: 212 LBS | DIASTOLIC BLOOD PRESSURE: 89 MMHG | RESPIRATION RATE: 18 BRPM | SYSTOLIC BLOOD PRESSURE: 157 MMHG | HEART RATE: 65 BPM

## 2022-02-27 NOTE — ED INITIAL ASSESSMENT (HPI)
Pt reports he slipped on ice fell backwards hit his head. No LOC. Pt does take 325mg of aspirin daily. Pt has bruising and lac on the top of his head, and having memory issues after his fall. Pt is A/Ox4. Pt reports having a \"welt\" left lower back. Pt ambulating with a steady gait.

## (undated) NOTE — LETTER
Date & Time: 4/5/2021, 4:01 PM  Patient: Steve Blow  Encounter Provider(s):    Ashley Parekh MD         This certifies that Maxsherman Geremiasalysia, a patient at an [facility] facility, am leaving the facility voluntarily and angelica